# Patient Record
Sex: FEMALE | URBAN - METROPOLITAN AREA
[De-identification: names, ages, dates, MRNs, and addresses within clinical notes are randomized per-mention and may not be internally consistent; named-entity substitution may affect disease eponyms.]

---

## 2018-06-17 ENCOUNTER — EMERGENCY (EMERGENCY)
Facility: HOSPITAL | Age: 43
LOS: 1 days | Discharge: HOME | End: 2018-06-17
Attending: EMERGENCY MEDICINE | Admitting: EMERGENCY MEDICINE
Payer: MEDICAID

## 2018-06-17 VITALS
TEMPERATURE: 99 F | HEART RATE: 71 BPM | DIASTOLIC BLOOD PRESSURE: 60 MMHG | OXYGEN SATURATION: 100 % | RESPIRATION RATE: 16 BRPM | SYSTOLIC BLOOD PRESSURE: 99 MMHG

## 2018-06-17 VITALS
SYSTOLIC BLOOD PRESSURE: 103 MMHG | TEMPERATURE: 98 F | RESPIRATION RATE: 16 BRPM | HEART RATE: 69 BPM | DIASTOLIC BLOOD PRESSURE: 57 MMHG | OXYGEN SATURATION: 100 %

## 2018-06-17 PROCEDURE — 73630 X-RAY EXAM OF FOOT: CPT | Mod: 26,RT

## 2018-06-17 PROCEDURE — 99053 MED SERV 10PM-8AM 24 HR FAC: CPT

## 2018-06-17 PROCEDURE — 73610 X-RAY EXAM OF ANKLE: CPT | Mod: 26,RT

## 2018-06-17 PROCEDURE — 99283 EMERGENCY DEPT VISIT LOW MDM: CPT | Mod: 25

## 2018-06-17 RX ORDER — IBUPROFEN 200 MG
600 TABLET ORAL ONCE
Qty: 0 | Refills: 0 | Status: COMPLETED | OUTPATIENT
Start: 2018-06-17 | End: 2018-06-17

## 2018-06-17 RX ADMIN — Medication 600 MILLIGRAM(S): at 04:57

## 2018-06-17 NOTE — ED ADULT TRIAGE NOTE - CHIEF COMPLAINT QUOTE
presents with right ankle pain and swelling s/p mechanical fall @ 2000 yesterday down "small hill" Denies head trauma or LOC. Able to move fott, no discoloration observed, +pedal pulse. Denies Med Hx presents with right ankle pain and swelling s/p mechanical fall @ 2000 yesterday down "small hill" Denies head trauma or LOC. Able to move foot, no discoloration observed, +pedal pulse. States unable to bear weight on affected foot.  Denies Med Hx

## 2018-06-17 NOTE — ED ADULT NURSE NOTE - CHIEF COMPLAINT QUOTE
presents with right ankle pain and swelling s/p mechanical fall @ 2000 yesterday down "small hill" Denies head trauma or LOC. Able to move foot, no discoloration observed, +pedal pulse. States unable to bear weight on affected foot.  Denies Med Hx

## 2018-06-17 NOTE — ED ADULT NURSE NOTE - OBJECTIVE STATEMENT
Patient A&Ox4 ambulates with a limp due to R ankle injury walking down steps. Patient has full ROM to R ankle some ecchymotic areas to R lateral ankle. No other complaints at this time. Patient is able to walk on the foot. MD to see.

## 2018-06-17 NOTE — ED PROVIDER NOTE - PHYSICAL EXAMINATION
Amaya att: nad, ctabl, rrr, s1s2, rt ankle swollen and lateral aspect ecchymotic, rt dp intact and rt toes cap refill intact

## 2018-06-17 NOTE — ED POST DISCHARGE NOTE - RESULT SUMMARY
Rt Ankle Xray : There is a small nondisplaced avulsion fracture off of the lateral aspect of the cuboid bone in the amilcar of the lateral aspect of the Rt ankle with superimposed soft tissue swelling. Patient put in aircast and given crutched. Patient contact # [phone] busy signal. Admin PA to continue to try and contact patient.

## 2018-06-17 NOTE — ED PROVIDER NOTE - OBJECTIVE STATEMENT
42F with no sig pmh p/w R ankle injury. Pt was going down steps and tripped, had inversion injury. Reports pain to dorsum of R foot, lateral ankle, and plantar surface of foot when walking. Denies other injuries. Did not take anything for pain. 42F with no sig pmh p/w R ankle injury. Pt was going down steps and tripped, had inversion injury. Reports pain to dorsum of R foot, lateral ankle, and plantar surface of foot when walking. Denies other injuries. Did not take anything for pain.    05:00 Amaya att: 42F s/p slip and fall p/w rt ankle deformity and ttp. 1A Patient descending steps, missed a step, rolled rt ankle. Presents to ER with rt ankle swollen, tender and ecchymotic. Patient denies head trauma or loc.

## 2018-06-17 NOTE — ED PROVIDER NOTE - ATTENDING CONTRIBUTION TO CARE
Dr. Conde: I have personally seen and examined this patient at the bedside. I have fully participated in the care of this patient. I have reviewed all pertinent clinical information, including history, physical exam, plan and the Resident's note and agree except as noted. HPI above as by me. PE above as by me. DDX rt ankle sprain v fracture PLAN xr, RICE, air cast

## 2018-06-17 NOTE — ED PROVIDER NOTE - MEDICAL DECISION MAKING DETAILS
Pt with ankle sprain after trip and fall, neurovascularly intact. Will check xray ankle/foot, ibuprofen, reassess

## 2018-06-17 NOTE — ED PROVIDER NOTE - MUSCULOSKELETAL MINIMAL EXAM
swelling to anterolateral R ankle, ttp over dorsum of R foot; full ROM toes, DP pulse intact/RANGE OF MOTION LIMITED

## 2019-02-11 ENCOUNTER — EMERGENCY (EMERGENCY)
Facility: HOSPITAL | Age: 44
LOS: 1 days | Discharge: HOME | End: 2019-02-11
Attending: EMERGENCY MEDICINE
Payer: COMMERCIAL

## 2019-02-11 VITALS
HEART RATE: 72 BPM | RESPIRATION RATE: 16 BRPM | SYSTOLIC BLOOD PRESSURE: 119 MMHG | DIASTOLIC BLOOD PRESSURE: 78 MMHG | OXYGEN SATURATION: 98 % | TEMPERATURE: 97 F | WEIGHT: 138.01 LBS | HEIGHT: 67 IN

## 2019-02-11 LAB
ALBUMIN SERPL ELPH-MCNC: 4.2 G/DL — SIGNIFICANT CHANGE UP (ref 3.3–5)
ALP SERPL-CCNC: 39 U/L — LOW (ref 40–120)
ALT FLD-CCNC: 8 U/L — LOW (ref 10–45)
ANION GAP SERPL CALC-SCNC: 13 MMOL/L — SIGNIFICANT CHANGE UP (ref 5–17)
AST SERPL-CCNC: 10 U/L — SIGNIFICANT CHANGE UP (ref 10–40)
BASOPHILS # BLD AUTO: 0 K/UL — SIGNIFICANT CHANGE UP (ref 0–0.2)
BASOPHILS NFR BLD AUTO: 0.8 % — SIGNIFICANT CHANGE UP (ref 0–2)
BILIRUB SERPL-MCNC: 0.3 MG/DL — SIGNIFICANT CHANGE UP (ref 0.2–1.2)
BUN SERPL-MCNC: 7 MG/DL — SIGNIFICANT CHANGE UP (ref 7–23)
CALCIUM SERPL-MCNC: 9 MG/DL — SIGNIFICANT CHANGE UP (ref 8.4–10.5)
CHLORIDE SERPL-SCNC: 102 MMOL/L — SIGNIFICANT CHANGE UP (ref 96–108)
CO2 SERPL-SCNC: 24 MMOL/L — SIGNIFICANT CHANGE UP (ref 22–31)
CREAT SERPL-MCNC: 0.52 MG/DL — SIGNIFICANT CHANGE UP (ref 0.5–1.3)
EOSINOPHIL # BLD AUTO: 0.1 K/UL — SIGNIFICANT CHANGE UP (ref 0–0.5)
EOSINOPHIL NFR BLD AUTO: 1.8 % — SIGNIFICANT CHANGE UP (ref 0–6)
GLUCOSE SERPL-MCNC: 101 MG/DL — HIGH (ref 70–99)
HCT VFR BLD CALC: 33.5 % — LOW (ref 34.5–45)
HGB BLD-MCNC: 11.4 G/DL — LOW (ref 11.5–15.5)
LYMPHOCYTES # BLD AUTO: 2.5 K/UL — SIGNIFICANT CHANGE UP (ref 1–3.3)
LYMPHOCYTES # BLD AUTO: 43.5 % — SIGNIFICANT CHANGE UP (ref 13–44)
MCHC RBC-ENTMCNC: 29.9 PG — SIGNIFICANT CHANGE UP (ref 27–34)
MCHC RBC-ENTMCNC: 34.1 GM/DL — SIGNIFICANT CHANGE UP (ref 32–36)
MCV RBC AUTO: 87.9 FL — SIGNIFICANT CHANGE UP (ref 80–100)
MONOCYTES # BLD AUTO: 0.4 K/UL — SIGNIFICANT CHANGE UP (ref 0–0.9)
MONOCYTES NFR BLD AUTO: 7.5 % — SIGNIFICANT CHANGE UP (ref 2–14)
NEUTROPHILS # BLD AUTO: 2.7 K/UL — SIGNIFICANT CHANGE UP (ref 1.8–7.4)
NEUTROPHILS NFR BLD AUTO: 46.5 % — SIGNIFICANT CHANGE UP (ref 43–77)
PLATELET # BLD AUTO: 300 K/UL — SIGNIFICANT CHANGE UP (ref 150–400)
POTASSIUM SERPL-MCNC: 3.7 MMOL/L — SIGNIFICANT CHANGE UP (ref 3.5–5.3)
POTASSIUM SERPL-SCNC: 3.7 MMOL/L — SIGNIFICANT CHANGE UP (ref 3.5–5.3)
PROT SERPL-MCNC: 7.3 G/DL — SIGNIFICANT CHANGE UP (ref 6–8.3)
RBC # BLD: 3.81 M/UL — SIGNIFICANT CHANGE UP (ref 3.8–5.2)
RBC # FLD: 13.7 % — SIGNIFICANT CHANGE UP (ref 10.3–14.5)
SODIUM SERPL-SCNC: 139 MMOL/L — SIGNIFICANT CHANGE UP (ref 135–145)
WBC # BLD: 5.8 K/UL — SIGNIFICANT CHANGE UP (ref 3.8–10.5)
WBC # FLD AUTO: 5.8 K/UL — SIGNIFICANT CHANGE UP (ref 3.8–10.5)

## 2019-02-11 PROCEDURE — 70450 CT HEAD/BRAIN W/O DYE: CPT

## 2019-02-11 PROCEDURE — 99284 EMERGENCY DEPT VISIT MOD MDM: CPT

## 2019-02-11 PROCEDURE — 70450 CT HEAD/BRAIN W/O DYE: CPT | Mod: 26

## 2019-02-11 PROCEDURE — 85027 COMPLETE CBC AUTOMATED: CPT

## 2019-02-11 PROCEDURE — 80053 COMPREHEN METABOLIC PANEL: CPT

## 2019-02-11 RX ORDER — CEPHALEXIN 500 MG
1 CAPSULE ORAL
Qty: 24 | Refills: 0 | OUTPATIENT
Start: 2019-02-11 | End: 2019-02-16

## 2019-02-11 RX ORDER — ACETAMINOPHEN 500 MG
650 TABLET ORAL ONCE
Qty: 0 | Refills: 0 | Status: COMPLETED | OUTPATIENT
Start: 2019-02-11 | End: 2019-02-11

## 2019-02-11 RX ORDER — CEPHALEXIN 500 MG
500 CAPSULE ORAL ONCE
Qty: 0 | Refills: 0 | Status: COMPLETED | OUTPATIENT
Start: 2019-02-11 | End: 2019-02-11

## 2019-02-11 RX ADMIN — Medication 650 MILLIGRAM(S): at 21:23

## 2019-02-11 RX ADMIN — Medication 500 MILLIGRAM(S): at 22:17

## 2019-02-11 NOTE — ED PROVIDER NOTE - CARE PLAN
Principal Discharge DX:	Concussion without loss of consciousness, initial encounter  Secondary Diagnosis:	Infected abrasion of scalp, initial encounter

## 2019-02-11 NOTE — ED PROVIDER NOTE - OBJECTIVE STATEMENT
43F otherwise healthy presents from urgent care after being evaluated for worsening head pain, nausea, and dizziness in setting of recent head trauma. Per pt and , last week while pt was looking under the sink for something she bumped the back of her head on the plumbing. 3-4 days later the dizziness started and was accompanied by some nausea (no vomiting). On ROS pt also describes noticing enlarged lymphnodes in the L neck and L supraclavicular region. Also endorses intermittent subjective f/c over last few days. 43F otherwise healthy presents from urgent care after being evaluated for worsening head pain, nausea, and dizziness in setting of recent head trauma. Per pt and , last week while pt was looking under the sink for something she bumped the back of her head on the plumbing. 3-4 days later the dizziness started and was accompanied by some nausea (no vomiting). On ROS pt also describes noticing enlarged lymphnodes in the L neck and L supraclavicular region. Also endorses intermittent subjective f/c over last few days.     Attending note. Patient was seen in the fast [facility] hallway. Agree with the above. She complains of headache, dizziness and bilateral neck pain and chills last night. Patient struck her head on plumbing underneath a sink approximately 10 days ago. 3 days ago patient states headache became worse and patient started having drainage from the skin wound on the scalp. Patient also complains of nausea but no vomiting. Patient had no loss of consciousness or focal neurologic symptoms.

## 2019-02-11 NOTE — ED PROVIDER NOTE - MEDICAL DECISION MAKING DETAILS
43F no pmhx presenting for head pain, dizziness/nausea, and infectious symptoms in setting of recent head trauma with resultant wound in left parietal distribution. 2x2cm circular abrasion to L parietal scalp with granulation tissue and clear drainage. +lymphadenopathy on L neck, L supraclavicular region. PE otherwise WNL. Will obtain CT Head given nausea, dizziness to rule out IC process, will also check basics and give tylenol in light of infectious symptoms. 43F no pmhx presenting for head pain, dizziness/nausea, and infectious symptoms in setting of recent head trauma with resultant wound in left parietal distribution. 2x2cm circular abrasion to L parietal scalp with granulation tissue and clear drainage. +lymphadenopathy on L neck, L supraclavicular region. PE otherwise WNL. Will obtain CT Head given nausea, dizziness to rule out IC process, will also check basics and give tylenol in light of infectious symptoms.      Attending note. Percussion of 10 days duration. Infected scalp abrasion. Antibiotics. Follow up with concussion management program.

## 2019-02-11 NOTE — ED PROVIDER NOTE - NSFOLLOWUPINSTRUCTIONS_ED_ALL_ED_FT
1- Keflex 500 mg every 6 hours for next 6 days  2- Tylenol as needed for pain  3- Follow up with your doctor or our medicine clinic  4- Any worsening pain, swelling, fevers, chills, or worsening rash come back to the ER immediately 1- Keflex 500 mg every 6 hours for next 6 days  2- Tylenol as needed for pain  3- Follow up with your doctor or our medicine clinic  4- Any worsening pain, swelling, fevers, chills, or worsening rash come back to the ER immediately  Concussion Program - call tomorrow [phone]  for further evaluation

## 2019-02-11 NOTE — ED ADULT NURSE NOTE - OBJECTIVE STATEMENT
43y female otherwise healthy presents to the ER c/o dizziness since head injury. pt is alert and oriented x 4 and speaking coherently. Pt states that she hit her head on the bathroom sink corner and since has had headaches and dizziness. pt in nad. pt has small 2cm by 2 cm abrasion to top of head. not actively bleeding, granulation noted and redness. pt in nad. denies vomiting, has nausea. denies fevers, chills, cp, sob. md gusman completed. vss. will reassess.

## 2019-02-11 NOTE — ED PROVIDER NOTE - NSFOLLOWUPCLINICS_GEN_ALL_ED_FT
[facility] General Internal Medicine  General Internal Medicine  [address] [id number]  Phone: [phone]  Fax:   Follow Up Time:

## 2019-02-11 NOTE — ED PROVIDER NOTE - PHYSICAL EXAMINATION
+ head pain, dizziness, nausea + head pain, dizziness, nausea      Attending note. Patient is alert and in no acute distress. Patient has a 1 x 2 cm infected abrasion on the scalp with tender posterior cervical and occipital adenopathy. Pupils are 3 mm equal reactive. There is no facial asymmetry. Neurologic examination is intact and nonfocal.

## 2019-02-12 VITALS
SYSTOLIC BLOOD PRESSURE: 100 MMHG | DIASTOLIC BLOOD PRESSURE: 64 MMHG | HEART RATE: 58 BPM | TEMPERATURE: 98 F | OXYGEN SATURATION: 100 % | RESPIRATION RATE: 18 BRPM

## 2025-07-09 NOTE — ED ADULT NURSE NOTE - DISCHARGE DATE/TIME
MRI department called at [facility] and spoke with a MRI tech who was a float and he advised calling tomorrow to speak with another tech. Will call tomorrow.    17-Jun-2018 06:32